# Patient Record
(demographics unavailable — no encounter records)

---

## 2024-10-18 NOTE — PHYSICAL EXAM
[JVD] : no jugular venous distention  [Normal Breath Sounds] : Normal breath sounds [Normal Heart Sounds] : normal heart sounds [Abdominal Masses] : No abdominal masses [Abdomen Tenderness] : ~T ~M No abdominal tenderness [Tender] : was nontender [Enlarged] : not enlarged [Purpura] : no purpura  [Alert] : alert [Oriented to Person] : oriented to person [Oriented to Place] : oriented to place [Oriented to Time] : oriented to time [Calm] : calm [de-identified] : RIZWANA. Gianna. Appropriate. Comfortable. [de-identified] : Pupils equal. No Scleral Icterus. NCAT. [de-identified] : Supple. Trachea midline. No overt lymphadenopathy. No JVD [de-identified] : Abdomen is soft, non tender and non distended. There is an uncomfortable mass mid abdomen. Not completely reducible. Spongy. Unable to appreciate fascial defect. Well healed surgical scars from cholecystectomy.

## 2024-10-18 NOTE — HISTORY OF PRESENT ILLNESS
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 Recently had a left hip replacement with Dr. Panda. Has a history of cholecystectomy in the past (also [facility]) a few years ago for cholecystitis. Patient reports surgery went well. Recently noted to have a bulge mid abdomen. Dr. Panda noted this and referred him to us for suspicion of incisional hernia. He denies any obstructive symptoms. Does report uncomfortable mass.

## 2024-10-18 NOTE — ASSESSMENT
[FreeTextEntry1] : Patient is a 74 year old male. New abdominal mass. Some discomfort. There is concern for incisional hernia vs other etiology. He requires a CT scan for pre operative planning. Surgical approach and treatment pending the CT. I answered all questions.  refuses

## 2024-12-20 NOTE — HISTORY OF PRESENT ILLNESS
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 Panda. Has a history of cholecystectomy in the past (also [facility]) a few years ago for cholecystitis. Patient reports surgery went well. Recently noted to have a bulge mid abdomen. Dr. Panda noted this and referred him to us for suspicion of incisional hernia. He denies any obstructive symptoms. Does report uncomfortable mass.  [de-identified] : 12-20-24: Returns to office. He reports he is overall doing well. He reports the bulge has increased in size since last visit. He denies any significant pain, some discomfort when pressure is applied to the bulge. He underwent a CT scan of the abdomen/pelvis which revealed a moderate-sized umbilical hernia containing small segment of non-obstructed small bowel and peritoneal fat. No evidence of strangulation. Here today to discuss surgical options.

## 2024-12-20 NOTE — ASSESSMENT
[FreeTextEntry1] : 75 y/o male with an umbilical/incisional hernia. Wishes to have this repaired.   We discussed the risk,benefits and alternatives to OPEN incisional/umbilical hernia repair with mesh in detail including but not limited to bleeding, infection, death, disability, blood clots, cardiac, pulmonary, neurological problems, prolonged hospital course, need for additional procedures, need for implants, recurrence of the hernia, displeasure with cosmetic outcome and other issues. Patient expressed understanding and wishes to proceed with surgery. All questions were answered.   Plan: OPEN incisional/umbilical hernia repair with mesh

## 2024-12-20 NOTE — PHYSICAL EXAM
[Normal Breath Sounds] : Normal breath sounds [Normal Heart Sounds] : normal heart sounds [Alert] : alert [Oriented to Person] : oriented to person [Oriented to Place] : oriented to place [Oriented to Time] : oriented to time [Calm] : calm [JVD] : no jugular venous distention  [Abdominal Masses] : No abdominal masses [Abdomen Tenderness] : ~T ~M No abdominal tenderness [Tender] : was nontender [Enlarged] : not enlarged [Purpura] : no purpura  [de-identified] : RIZWANA. Gianna. Appropriate. Comfortable. [de-identified] : Pupils equal. No Scleral Icterus. NCAT. [de-identified] : Supple. Trachea midline. No overt lymphadenopathy. No JVD [de-identified] : Abdomen is soft, non tender and non distended. Prior incisions well healed. There is an moderate size umbilical/incisional hernia, reducible, nontender.

## 2024-12-20 NOTE — DATA REVIEWED
[FreeTextEntry1] : CT ABDOMEN AND PELVIS - 11/14/2024  FINDINGS: LOWER CHEST: Within normal limits.  LIVER: Within normal limits. BILE DUCTS: Normal caliber. GALLBLADDER: Cholecystectomy. SPLEEN: A few scattered calcifications. PANCREAS: Within normal limits. ADRENALS: Within normal limits. KIDNEYS/URETERS: Within normal limits.  BLADDER: Minimally distended. REPRODUCTIVE ORGANS: Prostate within normal limits.  BOWEL: No bowel obstruction. Appendix is normal. PERITONEUM/RETROPERITONEUM: Within normal limits. VESSELS: Within normal limits. LYMPH NODES: No lymphadenopathy. ABDOMINAL WALL: Umbilical hernia containing a small segment of nonobstructed small bowel and peritoneal fat. The defect in the anterior abdominal wall measures approximately 4.7 cm across (2:67) and the hernia sac measures approximately 5.7 cm across (2:67). No evidence of strangulation. BONES: Degenerative changes. Left total hip arthroplasty.  IMPRESSION: 1.  Moderate-sized umbilical hernia containing small segment of non-obstructed small bowel and peritoneal fat. No evidence of strangulation. 2.  No abdominal masses.

## 2024-12-20 NOTE — HISTORY OF PRESENT ILLNESS
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 Panda. Has a history of cholecystectomy in the past (also [facility]) a few years ago for cholecystitis. Patient reports surgery went well. Recently noted to have a bulge mid abdomen. Dr. Panda noted this and referred him to us for suspicion of incisional hernia. He denies any obstructive symptoms. Does report uncomfortable mass.  [de-identified] : 12-20-24: Returns to office. He reports he is overall doing well. He reports the bulge has increased in size since last visit. He denies any significant pain, some discomfort when pressure is applied to the bulge. He underwent a CT scan of the abdomen/pelvis which revealed a moderate-sized umbilical hernia containing small segment of non-obstructed small bowel and peritoneal fat. No evidence of strangulation. Here today to discuss surgical options.

## 2024-12-20 NOTE — ASSESSMENT
[FreeTextEntry1] : 73 y/o male with an umbilical/incisional hernia. Wishes to have this repaired.   We discussed the risk,benefits and alternatives to OPEN incisional/umbilical hernia repair with mesh in detail including but not limited to bleeding, infection, death, disability, blood clots, cardiac, pulmonary, neurological problems, prolonged hospital course, need for additional procedures, need for implants, recurrence of the hernia, displeasure with cosmetic outcome and other issues. Patient expressed understanding and wishes to proceed with surgery. All questions were answered.   Plan: OPEN incisional/umbilical hernia repair with mesh

## 2024-12-20 NOTE — PHYSICAL EXAM
[Normal Breath Sounds] : Normal breath sounds [Normal Heart Sounds] : normal heart sounds [Alert] : alert [Oriented to Person] : oriented to person [Oriented to Place] : oriented to place [Oriented to Time] : oriented to time [Calm] : calm [JVD] : no jugular venous distention  [Abdominal Masses] : No abdominal masses [Abdomen Tenderness] : ~T ~M No abdominal tenderness [Tender] : was nontender [Enlarged] : not enlarged [Purpura] : no purpura  [de-identified] : RIZWANA. Gianna. Appropriate. Comfortable. [de-identified] : Pupils equal. No Scleral Icterus. NCAT. [de-identified] : Supple. Trachea midline. No overt lymphadenopathy. No JVD [de-identified] : Abdomen is soft, non tender and non distended. Prior incisions well healed. There is an moderate size umbilical/incisional hernia, reducible, nontender.

## 2024-12-20 NOTE — PLAN
[FreeTextEntry1] : Ro BAE PA-C, am scribing for and the presence of Dr.Robert Coleman the following sections HISTORY OF PRESENT ILLNESSS, PAST MEDICAL/FAMILY/SOCIAL HISTORY; REVIEW OF SYSTEMS; VITAL SIGNS; PHYSICAL EXAM; DISPOSITION.